# Patient Record
Sex: FEMALE | Race: WHITE | NOT HISPANIC OR LATINO | URBAN - METROPOLITAN AREA
[De-identification: names, ages, dates, MRNs, and addresses within clinical notes are randomized per-mention and may not be internally consistent; named-entity substitution may affect disease eponyms.]

---

## 2019-09-09 PROBLEM — 31297008 SOMATOFORM DISORDER: Status: ACTIVE | Noted: 2019-09-09

## 2020-07-13 ENCOUNTER — OFFICE VISIT (OUTPATIENT)
Dept: URBAN - METROPOLITAN AREA CLINIC 78 | Facility: CLINIC | Age: 70
End: 2020-07-13
Payer: MEDICARE

## 2020-07-13 DIAGNOSIS — K59.01 CONSTIPATION: ICD-10-CM

## 2020-07-13 DIAGNOSIS — K21.9 GERD: ICD-10-CM

## 2020-07-13 DIAGNOSIS — F45.8 GLOBUS SENSATION: ICD-10-CM

## 2020-07-13 DIAGNOSIS — G20 PARKINSON DISEASE: ICD-10-CM

## 2020-07-13 DIAGNOSIS — R68.89 THROAT CLEARING: ICD-10-CM

## 2020-07-13 PROCEDURE — 99204 OFFICE O/P NEW MOD 45 MIN: CPT | Performed by: INTERNAL MEDICINE

## 2020-07-13 PROCEDURE — 99244 OFF/OP CNSLTJ NEW/EST MOD 40: CPT | Performed by: INTERNAL MEDICINE

## 2020-07-13 NOTE — HPI-OTHER HISTORIES
The patient has been previously followed by Dr. Richter for constipation; last seen by her in 2016.  She has a history of slow transit constipation based on Sitz marker study and redundant colon on colonoscopy in 2012.  She failed a trial of Amitizia and  Linzess (both 145mcg and 290mcg doses). She takes Dulcolax ~ twice a month. She overall states that she has a soft, formed BM once or twice a week. She has ongoing straining  and a sense of incomplete evacuation.   The patient was diagnosed with Parkinson's disease 3 years ago.  She describes a globus sensation. She feels a thick coat of mucus and feels she has to clear her throat often. She occasionally feels like she chokes or has to cough as the food gets caught in her throat. She denies dyspahgia or odynophagia per se. She admits to heartburn chronically and has been on Prilosec 20mg QD. About a year ago she was using it on a prn basis, but now feels that she needs to use it on a regular basis. She has occasional hoarseness. She also has known thyroid nodules. She is scheduled to have thyroid nodule biopsy via her endocrinologist next month. She has tried OTC Zyrtec and Bendaryl with no improvement in her symptoms.   She has never had an EGD. No FH of esophageal or gastric cancer.  She has not been experiencing rectal bleeding. She denies abdominal pain. Appetite has been good. No unitentional weight loss.    She has not yet had a swallow study.     Her son has Crohn's disease. Her father had bladder cancer. No FH of colon cancer or colon polyps. She is on  Paxil daily for depression/anxiety.    Summary of prior workup: - Colonoscopy by Dr. Richter in 2012 showed a redundant colon and hemorrhoids. A repeat colonoscopy was advised in 10 years.

## 2020-07-15 ENCOUNTER — OFFICE VISIT (OUTPATIENT)
Dept: URBAN - METROPOLITAN AREA SURGERY CENTER 15 | Facility: SURGERY CENTER | Age: 70
End: 2020-07-15
Payer: MEDICARE

## 2020-07-15 ENCOUNTER — CLAIMS CREATED FROM THE CLAIM WINDOW (OUTPATIENT)
Dept: URBAN - METROPOLITAN AREA CLINIC 4 | Facility: CLINIC | Age: 70
End: 2020-07-15
Payer: MEDICARE

## 2020-07-15 DIAGNOSIS — K21.9 ACID REFLUX: ICD-10-CM

## 2020-07-15 DIAGNOSIS — K31.89 OTHER DISEASES OF STOMACH AND DUODENUM: ICD-10-CM

## 2020-07-15 DIAGNOSIS — K31.89 ACQUIRED DEFORMITY OF PYLORUS: ICD-10-CM

## 2020-07-15 DIAGNOSIS — K31.7 BENIGN GASTRIC POLYP: ICD-10-CM

## 2020-07-15 DIAGNOSIS — K31.7 POLYP OF STOMACH AND DUODENUM: ICD-10-CM

## 2020-07-15 DIAGNOSIS — K44.9 DIAPHRAGMATIC HERNIA: ICD-10-CM

## 2020-07-15 DIAGNOSIS — K21.0 GASTRO-ESOPHAGEAL REFLUX DISEASE WITH ESOPHAGITIS: ICD-10-CM

## 2020-07-15 DIAGNOSIS — K29.70 GASTRITIS, UNSPECIFIED, WITHOUT BLEEDING: ICD-10-CM

## 2020-07-15 PROCEDURE — 88312 SPECIAL STAINS GROUP 1: CPT | Performed by: PATHOLOGY

## 2020-07-15 PROCEDURE — 88305 TISSUE EXAM BY PATHOLOGIST: CPT | Performed by: PATHOLOGY

## 2020-07-15 PROCEDURE — 43239 EGD BIOPSY SINGLE/MULTIPLE: CPT | Performed by: INTERNAL MEDICINE

## 2020-07-15 PROCEDURE — G8907 PT DOC NO EVENTS ON DISCHARG: HCPCS | Performed by: INTERNAL MEDICINE

## 2020-07-20 ENCOUNTER — OFFICE VISIT (OUTPATIENT)
Dept: URBAN - METROPOLITAN AREA CLINIC 33 | Facility: CLINIC | Age: 70
End: 2020-07-20

## 2020-08-17 ENCOUNTER — OFFICE VISIT (OUTPATIENT)
Dept: URBAN - METROPOLITAN AREA CLINIC 78 | Facility: CLINIC | Age: 70
End: 2020-08-17
Payer: MEDICARE

## 2020-08-17 DIAGNOSIS — R68.89 THROAT CLEARING: ICD-10-CM

## 2020-08-17 DIAGNOSIS — K59.01 CONSTIPATION: ICD-10-CM

## 2020-08-17 DIAGNOSIS — F45.8 GLOBUS SENSATION: ICD-10-CM

## 2020-08-17 DIAGNOSIS — G20 PARKINSON DISEASE: ICD-10-CM

## 2020-08-17 DIAGNOSIS — K21.9 GERD: ICD-10-CM

## 2020-08-17 PROCEDURE — 99214 OFFICE O/P EST MOD 30 MIN: CPT | Performed by: INTERNAL MEDICINE

## 2020-08-17 PROCEDURE — 3017F COLORECTAL CA SCREEN DOC REV: CPT | Performed by: INTERNAL MEDICINE

## 2020-08-17 PROCEDURE — G8420 CALC BMI NORM PARAMETERS: HCPCS | Performed by: INTERNAL MEDICINE

## 2020-08-17 PROCEDURE — G8427 DOCREV CUR MEDS BY ELIG CLIN: HCPCS | Performed by: INTERNAL MEDICINE

## 2020-08-17 PROCEDURE — G9903 PT SCRN TBCO ID AS NON USER: HCPCS | Performed by: INTERNAL MEDICINE

## 2020-08-17 RX ORDER — PLECANATIDE 3 MG/1
1 TABLET TABLET ORAL ONCE A DAY
Qty: 90 TABLET | Refills: 1 | OUTPATIENT
Start: 2020-08-17

## 2020-08-17 RX ORDER — OMEPRAZOLE 40 MG/1
1 CAPSULE 30 MINUTES BEFORE MORNING MEAL AND 30 MINUTES BEFORE DINNER CAPSULE, DELAYED RELEASE ORAL BID
Qty: 60 | Refills: 2 | OUTPATIENT

## 2020-08-17 NOTE — HPI-OTHER HISTORIES
The patient has been previously followed by Dr. Richter for constipation; last seen by her in 2016.  She has a history of slow transit constipation based on Sitz marker study and redundant colon on colonoscopy in 2012.  She failed a trial of Amitizia and  Linzess (both 145mcg and 290mcg doses). She takes Dulcolax ~ twice a month. She overall states that she has a soft, formed BM once or twice a week. She has ongoing straining  and a sense of incomplete evacuation. I had her try Trulance at her last visit and she felt that it helped with her BM's. She felt less bloated. She would like for me to send in a prescription to her pharmacy.   The patient was diagnosed with Parkinson's disease 3 years ago.  She describes a globus sensation. She feels a thick coat of mucus and feels she has to clear her throat often. She occasionally feels like she chokes or has to cough as the food gets caught in her throat. She denies dyspahgia or odynophagia per se. She admits to heartburn chronically and has been on Prilosec 20mg QD. About a year ago she was using it on a prn basis, but now feels that she needs to use it on a regular basis. She has occasional hoarseness. She also has known thyroid nodules. She is scheduled to have thyroid nodule biopsy via her endocrinologist next month. She has tried OTC Zyrtec and Bendaryl with no improvement in her symptoms.   Today we reviewed the results of her EGD. No FH of esophageal or gastric cancer.  She has not been experiencing rectal bleeding. She denies abdominal pain. Appetite has been good. No unitentional weight loss.    She has not yet had a swallow study.     Her son has Crohn's disease. Her father had bladder cancer. No FH of colon cancer or colon polyps. She is on  Paxil daily for depression/anxiety.    Summary of prior workup: - EGD by me on 7/15/20 revealed a normal esophagus, 2 cm hiatal hernia, multiple fundic gland polyps, mild antral erythema, small bilious fluid in the body and andtrum and a normal duodenum/papilla except for localized lymphangiectasia. Biopsies were negative for H pylori or celiac sprue; esophageal biopsies both from the upper and lower third of the esophagus were consistent with reflux type changes. - Colonoscopy by Dr. Richter in 2012 showed a redundant colon and hemorrhoids. A repeat colonoscopy was advised in 10 years.

## 2020-09-24 ENCOUNTER — OFFICE VISIT (OUTPATIENT)
Dept: URBAN - METROPOLITAN AREA CLINIC 78 | Facility: CLINIC | Age: 70
End: 2020-09-24
Payer: MEDICARE

## 2020-09-24 ENCOUNTER — DASHBOARD ENCOUNTERS (OUTPATIENT)
Age: 70
End: 2020-09-24

## 2020-09-24 DIAGNOSIS — G20 PARKINSON DISEASE: ICD-10-CM

## 2020-09-24 DIAGNOSIS — K21.9 GERD: ICD-10-CM

## 2020-09-24 DIAGNOSIS — F45.8 GLOBUS SENSATION: ICD-10-CM

## 2020-09-24 DIAGNOSIS — R68.89 THROAT CLEARING: ICD-10-CM

## 2020-09-24 DIAGNOSIS — K59.01 CONSTIPATION: ICD-10-CM

## 2020-09-24 PROCEDURE — 99214 OFFICE O/P EST MOD 30 MIN: CPT | Performed by: INTERNAL MEDICINE

## 2020-09-24 PROCEDURE — G8420 CALC BMI NORM PARAMETERS: HCPCS | Performed by: INTERNAL MEDICINE

## 2020-09-24 PROCEDURE — 3017F COLORECTAL CA SCREEN DOC REV: CPT | Performed by: INTERNAL MEDICINE

## 2020-09-24 PROCEDURE — G8427 DOCREV CUR MEDS BY ELIG CLIN: HCPCS | Performed by: INTERNAL MEDICINE

## 2020-09-24 PROCEDURE — G9903 PT SCRN TBCO ID AS NON USER: HCPCS | Performed by: INTERNAL MEDICINE

## 2020-09-24 RX ORDER — SALICYLIC ACID 3 G/100G
1 TABLET LOTION TOPICAL ONCE A DAY
Qty: 30 | OUTPATIENT
Start: 2020-09-24

## 2020-09-24 RX ORDER — PLECANATIDE 3 MG/1
1 TABLET TABLET ORAL ONCE A DAY
Qty: 90 TABLET | Refills: 1 | Status: ON HOLD | COMMUNITY
Start: 2020-08-17

## 2020-09-24 RX ORDER — OMEPRAZOLE 40 MG/1
1 CAPSULE 30 MINUTES BEFORE MORNING MEAL CAPSULE, DELAYED RELEASE ORAL ONCE A DAY
Qty: 30 | Refills: 1

## 2020-09-24 RX ORDER — OMEPRAZOLE 40 MG/1
1 CAPSULE 30 MINUTES BEFORE MORNING MEAL AND 30 MINUTES BEFORE DINNER CAPSULE, DELAYED RELEASE ORAL BID
Qty: 60 | Refills: 2 | Status: ACTIVE | COMMUNITY

## 2020-09-24 NOTE — PHYSICAL EXAM NECK/THYROID:
2019  EMPLOYEE INFORMATION: EMPLOYER INFORMATION:   NAME: Lori Patricio TJ [place]   : 1962    DATE OF INJURY/EVENT: 2018          Location: [facility]   Treating Provider: Schuyler Tijerina MD,MPH  Time In:  10:26 AM Time Out:  10:52 AM      DIAGNOSIS:   1. Contusion of left hand, subsequent encounter    2. Traumatic hematoma of left hand, subsequent encounter    3. Abrasion of forearm, subsequent encounter      STATUS: This injury is determined to be WORK RELATED.   RETURN TO WORK:  Employee may return to work without restrictions.     Return Date: 2019           RESTRICTIONS:   Be careful  Diagnostic Testing:   XR HAND 3+ VIEW LEFT  ANTICIPATED MAXIMUM MEDICAL IMPROVEMENT:  2 weeks  TREATMENT PLAN:  MRI left hand  Advance activity as tolerated  Heat  Sling as needed  Stretches and range of motion  Medications as directed  FOLLOW UP:  1-2 days after MRI    Thank you for the privilege of providing medical care for this injury/condition.  If there are any questions, please call the clinic at Dept: [phone].    Electronically signed on 2019 by:   Schuyler Tijerina MD,MPH   Medical Director  [facility]     normal appearance , without tenderness upon palpation , no deformities , trachea midline , Thyroid normal size , no thyroid nodules , no masses , no JVD , thyroid nontender

## 2020-09-24 NOTE — HPI-OTHER HISTORIES
The patient has suffered from chronic constipation.  She has a history of slow transit constipation based on Sitz marker study and redundant colon on colonoscopy in 2012.  She failed a trial of Amitiza and  Linzess (both 145mcg and 290mcg doses). Most recently I had her try Trulance which helped, but it was cost-prohibitive. She felt less bloated while on it. She takes Dulcolax ~ twice a month. She overall states that she has a soft, formed BM once or twice a week. She has ongoing straining  and a sense of incomplete evacuation.   The patient was diagnosed with Parkinson's disease 3 years ago.  She describes a globus sensation. She feels a thick coat of mucus and feels she has to clear her throat often. She occasionally feels like she chokes or has to cough as the food gets caught in her throat. She denies dysphagia or odynophagia per se. She admits to heartburn chronically and has been on Prilosec 20mg QD. About a year ago she was using it on a prn basis, but now feels that she needs to use it on a regular basis. She has occasional hoarseness. She also has known thyroid nodules. She recently had a thyroid nodule biopsy via her endocrinologist. She has tried OTC Zyrtec prn and Bendaryl with no improvement in her symptoms.  She has not been using either of these meds on a regular basis though.  Despite increasing the PPI to BID she could not tell an improvement.  She has been scheduled for a modified barium swallow study + Speech Therapy evalaution by her neurologist.     Her son has Crohn's disease. Her father had bladder cancer. No FH of colon cancer or colon polyps. She is on  Paxil daily for depression/anxiety.  No FH of esophageal or gastric cancer.  She has not been experiencing rectal bleeding. She denies abdominal pain. Appetite has been good. No unitentional weight loss.       Summary of prior workup: - EGD by me on 7/15/20 revealed a normal esophagus, 2 cm hiatal hernia, multiple fundic gland polyps, mild antral erythema, small bilious fluid in the body and andtrum and a normal duodenum/papilla except for localized lymphangiectasia. Biopsies were negative for H pylori or celiac sprue; esophageal biopsies both from the upper and lower third of the esophagus were consistent with reflux type changes. - Colonoscopy by Dr. Richter in 2012 showed a redundant colon and hemorrhoids. A repeat colonoscopy was advised in 10 years.

## 2020-09-29 ENCOUNTER — WEB ENCOUNTER (OUTPATIENT)
Dept: URBAN - METROPOLITAN AREA CLINIC 78 | Facility: CLINIC | Age: 70
End: 2020-09-29

## 2021-04-05 ENCOUNTER — ERX REFILL RESPONSE (OUTPATIENT)
Dept: URBAN - METROPOLITAN AREA CLINIC 78 | Facility: CLINIC | Age: 71
End: 2021-04-05

## 2021-04-05 RX ORDER — OMEPRAZOLE 40 MG/1
1 CAPSULE 30 MINUTES BEFORE MORNING MEAL AND 30 MINUTES BEFORE DINNER CAPSULE, DELAYED RELEASE ORAL BID
Qty: 60 | Refills: 1

## 2023-06-23 NOTE — PHYSICAL EXAM CONSTITUTIONAL:
----- Message from Jossie Zimmer MD sent at 6/18/2023 12:13 PM CDT -----  Call pt TB test is normal, finish work form for pt please   normal kidney, liver function test  No diabetes  Cholesterol is worse than 2 years ago, please avoid animal fat and take supplement Red yeast rice extract, repeat lipids 6 months  Thyroid normal  Blood counts normal  Vit D  and B12 normal  Urine test normal  Dr. Jossie Zimmer       well developed, well nourished , in no acute distress , ambulating without difficulty , normal communication ability